# Patient Record
Sex: FEMALE | Race: WHITE
[De-identification: names, ages, dates, MRNs, and addresses within clinical notes are randomized per-mention and may not be internally consistent; named-entity substitution may affect disease eponyms.]

---

## 2021-03-27 ENCOUNTER — HOSPITAL ENCOUNTER (OUTPATIENT)
Dept: HOSPITAL 63 - DXRAD | Age: 81
End: 2021-03-27
Attending: NURSE PRACTITIONER
Payer: MEDICARE

## 2021-03-27 DIAGNOSIS — M47.814: ICD-10-CM

## 2021-03-27 DIAGNOSIS — R05: Primary | ICD-10-CM

## 2021-03-27 PROCEDURE — 71046 X-RAY EXAM CHEST 2 VIEWS: CPT

## 2021-03-27 NOTE — RAD
Exam Date:  3/27/2021 1:07 PM



XR CHEST 2V



Indication: Reason: COUGH. WEAKNESS. / Spl. Instructions:  / History:  







FINDINGS/

IMPRESSION:



There are patchy opacities in the lung bases bilaterally which may represent atelectasis and/or infil
trates.  

The cardiac silhouette is not enlarged with mild prominence of the pulmonary vasculature.  

There is no appreciable pleural effusion or pneumothorax.  

Degenerative changes are seen in the spine. 





Electronically signed by: Fady Dillon MD (3/27/2021 1:13 PM) [facility]

## 2021-08-05 ENCOUNTER — HOSPITAL ENCOUNTER (OUTPATIENT)
Dept: HOSPITAL 63 - 1 SOUTH | Age: 81
Setting detail: OBSERVATION
LOS: 1 days | Discharge: HOME | End: 2021-08-06
Attending: FAMILY MEDICINE | Admitting: FAMILY MEDICINE
Payer: MEDICARE

## 2021-08-05 VITALS — DIASTOLIC BLOOD PRESSURE: 81 MMHG | SYSTOLIC BLOOD PRESSURE: 135 MMHG

## 2021-08-05 VITALS — HEIGHT: 68 IN | WEIGHT: 235.23 LBS | BODY MASS INDEX: 35.65 KG/M2

## 2021-08-05 VITALS — SYSTOLIC BLOOD PRESSURE: 135 MMHG | DIASTOLIC BLOOD PRESSURE: 65 MMHG

## 2021-08-05 VITALS — DIASTOLIC BLOOD PRESSURE: 77 MMHG | SYSTOLIC BLOOD PRESSURE: 122 MMHG

## 2021-08-05 DIAGNOSIS — E43: ICD-10-CM

## 2021-08-05 DIAGNOSIS — Z79.899: ICD-10-CM

## 2021-08-05 DIAGNOSIS — E87.6: ICD-10-CM

## 2021-08-05 DIAGNOSIS — N18.31: ICD-10-CM

## 2021-08-05 DIAGNOSIS — N12: Primary | ICD-10-CM

## 2021-08-05 DIAGNOSIS — N39.0: ICD-10-CM

## 2021-08-05 LAB
% BANDS: 5 % (ref 0–9)
% LYMPHS: 38 % (ref 24–48)
% MONOS: 10 % (ref 0–10)
% MYELOS: 1 % (ref 0–0)
% SEGS: 46 % (ref 35–66)
ALBUMIN SERPL-MCNC: 2.6 G/DL (ref 3.4–5)
ALBUMIN/GLOB SERPL: 0.7 {RATIO} (ref 1–1.7)
ALP SERPL-CCNC: 121 U/L (ref 46–116)
ALT SERPL-CCNC: 43 U/L (ref 14–59)
ANION GAP SERPL CALC-SCNC: 9 MMOL/L (ref 6–14)
AST SERPL-CCNC: 38 U/L (ref 15–37)
BASOPHILS # BLD AUTO: 0 X10^3/UL (ref 0–0.2)
BASOPHILS NFR BLD: 0 % (ref 0–3)
BILIRUB SERPL-MCNC: 0.5 MG/DL (ref 0.2–1)
BUN/CREAT SERPL: 22 (ref 6–20)
CA-I SERPL ISE-MCNC: 26 MG/DL (ref 7–20)
CALCIUM SERPL-MCNC: 8.9 MG/DL (ref 8.5–10.1)
CHLORIDE SERPL-SCNC: 99 MMOL/L (ref 98–107)
CO2 SERPL-SCNC: 31 MMOL/L (ref 21–32)
CREAT SERPL-MCNC: 1.2 MG/DL (ref 0.6–1)
EOSINOPHIL NFR BLD: 0 % (ref 0–3)
EOSINOPHIL NFR BLD: 0 X10^3/UL (ref 0–0.7)
ERYTHROCYTE [DISTWIDTH] IN BLOOD BY AUTOMATED COUNT: 14.2 % (ref 11.5–14.5)
GFR SERPLBLD BASED ON 1.73 SQ M-ARVRAT: 43.1 ML/MIN
GLOBULIN SER-MCNC: 3.6 G/DL (ref 2.2–3.8)
GLUCOSE SERPL-MCNC: 135 MG/DL (ref 70–99)
HCT VFR BLD CALC: 34 % (ref 36–47)
HGB BLD-MCNC: 11.2 G/DL (ref 12–15.5)
LYMPHOCYTES # BLD: 2 X10^3/UL (ref 1–4.8)
LYMPHOCYTES NFR BLD AUTO: 30 % (ref 24–48)
MCH RBC QN AUTO: 30 PG (ref 25–35)
MCHC RBC AUTO-ENTMCNC: 33 G/DL (ref 31–37)
MCV RBC AUTO: 91 FL (ref 79–100)
MONO #: 1 X10^3/UL (ref 0–1.1)
MONOCYTES NFR BLD: 15 % (ref 0–9)
NEUT #: 3.5 X10^3UL (ref 1.8–7.7)
NEUTROPHILS NFR BLD AUTO: 54 % (ref 31–73)
PLATELET # BLD AUTO: 144 X10^3/UL (ref 140–400)
PLATELET # BLD EST: ADEQUATE 10*3/UL
POTASSIUM SERPL-SCNC: 3.1 MMOL/L (ref 3.5–5.1)
PROT SERPL-MCNC: 6.2 G/DL (ref 6.4–8.2)
RBC # BLD AUTO: 3.74 X10^6/UL (ref 3.5–5.4)
SODIUM SERPL-SCNC: 139 MMOL/L (ref 136–145)
WBC # BLD AUTO: 6.6 X10^3/UL (ref 4–11)

## 2021-08-05 PROCEDURE — 36415 COLL VENOUS BLD VENIPUNCTURE: CPT

## 2021-08-05 PROCEDURE — 83605 ASSAY OF LACTIC ACID: CPT

## 2021-08-05 PROCEDURE — 87040 BLOOD CULTURE FOR BACTERIA: CPT

## 2021-08-05 PROCEDURE — G0379 DIRECT REFER HOSPITAL OBSERV: HCPCS

## 2021-08-05 PROCEDURE — 71046 X-RAY EXAM CHEST 2 VIEWS: CPT

## 2021-08-05 PROCEDURE — 85007 BL SMEAR W/DIFF WBC COUNT: CPT

## 2021-08-05 PROCEDURE — 96372 THER/PROPH/DIAG INJ SC/IM: CPT

## 2021-08-05 PROCEDURE — 85025 COMPLETE CBC W/AUTO DIFF WBC: CPT

## 2021-08-05 PROCEDURE — 93005 ELECTROCARDIOGRAM TRACING: CPT

## 2021-08-05 PROCEDURE — 81001 URINALYSIS AUTO W/SCOPE: CPT

## 2021-08-05 PROCEDURE — 85379 FIBRIN DEGRADATION QUANT: CPT

## 2021-08-05 PROCEDURE — 96365 THER/PROPH/DIAG IV INF INIT: CPT

## 2021-08-05 PROCEDURE — 80048 BASIC METABOLIC PNL TOTAL CA: CPT

## 2021-08-05 PROCEDURE — 96366 THER/PROPH/DIAG IV INF ADDON: CPT

## 2021-08-05 PROCEDURE — G0378 HOSPITAL OBSERVATION PER HR: HCPCS

## 2021-08-05 PROCEDURE — 80053 COMPREHEN METABOLIC PANEL: CPT

## 2021-08-05 PROCEDURE — 96368 THER/DIAG CONCURRENT INF: CPT

## 2021-08-05 RX ADMIN — Medication SCH CAP: at 20:11

## 2021-08-05 NOTE — EKG
[facility] [address], [place] [address]

Test Date:    2021               Test Time:    18:09:13

Pat Name:     HIMANSHU RODRIGUEZ            Department:   

Patient ID:   [id number]           Room:         111 A

Gender:       F                        Technician:   

:          1940               Requested By: KARI FERNANDEZ

Order Number: [id number]            Reading MD:     

                                 Measurements

Intervals                              Axis          

Rate:         81                       P:            39

IN:           150                      QRS:          15

QRSD:         110                      T:            24

QT:           380                                    

QTc:          447                                    

                           Interpretive Statements

SINUS RHYTHM

QRS(T) CONTOUR ABNORMALITY

CONSIDER INFERIOR MYOCARDIAL DAMAGE

POSSIBLY ABNORMAL ECG

RI6.01

No previous ECG available for comparison

## 2021-08-06 VITALS — SYSTOLIC BLOOD PRESSURE: 116 MMHG | DIASTOLIC BLOOD PRESSURE: 82 MMHG

## 2021-08-06 VITALS — SYSTOLIC BLOOD PRESSURE: 122 MMHG | DIASTOLIC BLOOD PRESSURE: 73 MMHG

## 2021-08-06 LAB
ANION GAP SERPL CALC-SCNC: 9 MMOL/L (ref 6–14)
APTT PPP: YELLOW S
BACTERIA #/AREA URNS HPF: 0 /HPF
BILIRUB UR QL STRIP: (no result)
CA-I SERPL ISE-MCNC: 24 MG/DL (ref 7–20)
CALCIUM SERPL-MCNC: 9.5 MG/DL (ref 8.5–10.1)
CHLORIDE SERPL-SCNC: 101 MMOL/L (ref 98–107)
CO2 SERPL-SCNC: 31 MMOL/L (ref 21–32)
CREAT SERPL-MCNC: 1.1 MG/DL (ref 0.6–1)
FIBRINOGEN PPP-MCNC: CLEAR MG/DL
GFR SERPLBLD BASED ON 1.73 SQ M-ARVRAT: 47.7 ML/MIN
GLUCOSE SERPL-MCNC: 121 MG/DL (ref 70–99)
GLUCOSE UR STRIP-MCNC: (no result) MG/DL
NITRITE UR QL STRIP: (no result)
POTASSIUM SERPL-SCNC: 3.2 MMOL/L (ref 3.5–5.1)
RBC #/AREA URNS HPF: 0 /HPF (ref 0–2)
SODIUM SERPL-SCNC: 141 MMOL/L (ref 136–145)
SP GR UR STRIP: <=1.005
SQUAMOUS #/AREA URNS LPF: (no result) /LPF
UROBILINOGEN UR-MCNC: 0.2 MG/DL
WBC #/AREA URNS HPF: (no result) /HPF (ref 0–4)

## 2021-08-06 RX ADMIN — Medication SCH CAP: at 08:44

## 2021-08-06 NOTE — RAD
EXAM: Chest, 2 views.



HISTORY: Shortness of breath.



COMPARISON: 3/27/2021



FINDINGS: 2 views of the chest are obtained. There is stable diffuse increased interstitial opacity. 
There is no consolidation, pleural effusion or pneumothorax. There is stable enlargement of the left 
hilum. The heart is normal in size.



IMPRESSION: 

1. Stable diffuse increased interstitial opacity suggesting interstitial infiltrate or chronic inters
titial changes.

2. Stable left hilar prominence due to enlarged central pulmonary vessels or lymphadenopathy.



Electronically signed by: Fiorella Dumont MD (8/6/2021 8:30 AM) [id number]

## 2021-08-06 NOTE — DS
DATE OF DISCHARGE: 08/06/2021

HOSPITAL COURSE:  The patient is an 81-year-old admitted with possible 

pyelonephritis, been having nausea, vomiting, been following with increased 

weakness despite being treated as an outpatient for her what appeared to be a 

urinary tract infection and got up into her kidneys.  The patient was running a 

temperature of 103 the night before being admitted to the hospital. The patient 

was placed on IV antibiotic therapy.  X-rays did demonstrate the possibility of 

an interstitial infiltrate, possibility along with a course of urinary tract 

infection.  The patient otherwise has made excellent progress during the rest of

her hospitalization.  The patient had no trouble breathing, no chest pain, 

shortness of breath per se.  The patient did have slight elevation of her AST of

38, alkaline phosphatase 121.  The patient's renal function decreased slightly 

to 47 on GFR.  The patient's hemoglobin 11.2 and 34.  In any case, the patient 

was admitted, placed on IV antibiotic therapy of Levaquin and Rocephin, made 

excellent progress.  Final culture reports came back on her urine and showed E. 

coli.  She was placed on Levaquin.  I will make further evaluation on her as an 

outpatient where she was placed on oral antibiotics and she was able to keep 

things down and was discharged home.



IMPRESSION:  Therefore, pyelonephritis, possible interstitial pneumonia, 

hypokalemia, chronic kidney disease stage IIIA, severe protein malnutrition.



The patient will be discharged home.  Regular diet, decreased activity.  

Continue on the Levaquin.  Follow up accordingly in 7 to 10 days or sooner as 

needed.  Phone numbers were presented to the patient.







JB

DR: Carl   DD: 08/06/2021 14:11

DT: 08/06/2021 20:55   TID: [id number]

## 2021-08-17 ENCOUNTER — HOSPITAL ENCOUNTER (EMERGENCY)
Dept: HOSPITAL 63 - ER | Age: 81
Discharge: HOME | End: 2021-08-17
Payer: MEDICARE

## 2021-08-17 VITALS — DIASTOLIC BLOOD PRESSURE: 78 MMHG | SYSTOLIC BLOOD PRESSURE: 136 MMHG

## 2021-08-17 VITALS — WEIGHT: 235.23 LBS | BODY MASS INDEX: 35.65 KG/M2 | HEIGHT: 68 IN

## 2021-08-17 DIAGNOSIS — R42: Primary | ICD-10-CM

## 2021-08-17 LAB
ANION GAP SERPL CALC-SCNC: 10 MMOL/L (ref 6–14)
APTT PPP: YELLOW S
BACTERIA #/AREA URNS HPF: 0 /HPF
BASOPHILS # BLD AUTO: 0 X10^3/UL (ref 0–0.2)
BASOPHILS NFR BLD: 0 % (ref 0–3)
BILIRUB UR QL STRIP: (no result)
CA-I SERPL ISE-MCNC: 30 MG/DL (ref 7–20)
CALCIUM SERPL-MCNC: 8.7 MG/DL (ref 8.5–10.1)
CHLORIDE SERPL-SCNC: 101 MMOL/L (ref 98–107)
CO2 SERPL-SCNC: 29 MMOL/L (ref 21–32)
CREAT SERPL-MCNC: 1.1 MG/DL (ref 0.6–1)
EOSINOPHIL NFR BLD: 0.1 X10^3/UL (ref 0–0.7)
EOSINOPHIL NFR BLD: 1 % (ref 0–3)
ERYTHROCYTE [DISTWIDTH] IN BLOOD BY AUTOMATED COUNT: 15.3 % (ref 11.5–14.5)
FIBRINOGEN PPP-MCNC: CLEAR MG/DL
GFR SERPLBLD BASED ON 1.73 SQ M-ARVRAT: 47.7 ML/MIN
GLUCOSE SERPL-MCNC: 100 MG/DL (ref 70–99)
GLUCOSE UR STRIP-MCNC: (no result) MG/DL
HCT VFR BLD CALC: 38 % (ref 36–47)
HGB BLD-MCNC: 12.5 G/DL (ref 12–15.5)
LYMPHOCYTES # BLD: 4.4 X10^3/UL (ref 1–4.8)
LYMPHOCYTES NFR BLD AUTO: 45 % (ref 24–48)
MCH RBC QN AUTO: 30 PG (ref 25–35)
MCHC RBC AUTO-ENTMCNC: 33 G/DL (ref 31–37)
MCV RBC AUTO: 91 FL (ref 79–100)
MONO #: 0.8 X10^3/UL (ref 0–1.1)
MONOCYTES NFR BLD: 8 % (ref 0–9)
NEUT #: 4.6 X10^3UL (ref 1.8–7.7)
NEUTROPHILS NFR BLD AUTO: 47 % (ref 31–73)
NITRITE UR QL STRIP: (no result)
PLATELET # BLD AUTO: 178 X10^3/UL (ref 140–400)
POTASSIUM SERPL-SCNC: 4.2 MMOL/L (ref 3.5–5.1)
RBC # BLD AUTO: 4.17 X10^6/UL (ref 3.5–5.4)
RBC #/AREA URNS HPF: 0 /HPF (ref 0–2)
SODIUM SERPL-SCNC: 140 MMOL/L (ref 136–145)
SP GR UR STRIP: 1.01
SQUAMOUS #/AREA URNS LPF: (no result) /LPF
UROBILINOGEN UR-MCNC: 0.2 MG/DL
WBC # BLD AUTO: 9.9 X10^3/UL (ref 4–11)
WBC #/AREA URNS HPF: (no result) /HPF (ref 0–4)

## 2021-08-17 PROCEDURE — 99285 EMERGENCY DEPT VISIT HI MDM: CPT

## 2021-08-17 PROCEDURE — 93005 ELECTROCARDIOGRAM TRACING: CPT

## 2021-08-17 PROCEDURE — 85025 COMPLETE CBC W/AUTO DIFF WBC: CPT

## 2021-08-17 PROCEDURE — 96360 HYDRATION IV INFUSION INIT: CPT

## 2021-08-17 PROCEDURE — 71045 X-RAY EXAM CHEST 1 VIEW: CPT

## 2021-08-17 PROCEDURE — 84484 ASSAY OF TROPONIN QUANT: CPT

## 2021-08-17 PROCEDURE — 36415 COLL VENOUS BLD VENIPUNCTURE: CPT

## 2021-08-17 PROCEDURE — 80048 BASIC METABOLIC PNL TOTAL CA: CPT

## 2021-08-17 PROCEDURE — 81001 URINALYSIS AUTO W/SCOPE: CPT

## 2021-08-17 NOTE — RAD
Exam: Chest one view



INDICATION: Chest pain, cardiac workup



TECHNIQUE: Frontal view of the chest



Comparisons: 8/6/2021



FINDINGS:

Cardiomediastinal silhouette and pulmonary vessels are within normal limits.



Lung and pleural spaces are clear.



IMPRESSION:

No acute cardiopulmonary process



Electronically signed by: Jennifer Lawrence MD (8/17/2021 6:54 PM) [facility]

## 2021-08-17 NOTE — EKG
[facility] [address], [place] [address]

Test Date:    2021               Test Time:    19:22:42

Pat Name:     HIMANSHU RODRIGUEZ            Department:   

Patient ID:   [id number]           Room:          

Gender:       F                        Technician:   

:          1940               Requested By: SANTA RING

Order Number: [id number]            Reading MD:     

                                 Measurements

Intervals                              Axis          

Rate:         73                       P:            37

CT:           172                      QRS:          37

QRSD:         98                       T:            29

QT:           394                                    

QTc:          438                                    

                           Interpretive Statements

SINUS RHYTHM

OTHERWISE NORMAL ECG

RI6.02

No previous ECG available for comparison

## 2021-08-17 NOTE — PHYS DOC
Past History


Additional Past Medical Histor:  GOUT


Past Surgical History:  , Hysterectomy, Knee Replacement, Other


Additional Past Surgical Histo:  L RETINAL DETACHMENT, BLADDER LIFT





Adult General


Chief Complaint


Chief Complaint:  HYPOTENSION





HPI


HPI


Patient is an 81-year-old female with a past medical history significant for 

recent hospitalization for pyelonephritis, discharge 6 days ago on Levaquin and 

finished her course who presents with a chief complaint of intermittent 

lightheadedness since then.  States that since she is got home from the hospital

she has times during the day where she stands up fast and feels lightheaded.  

Denies any headache, actual feelings of faintness or like she is going to pass 

out, headache, fevers, chest pain, shortness of breath, abdominal pain, nausea, 

vomiting, dysuria, hematuria, diarrhea or blood in the stool.  Denies any 

confusion, slurred speech, numbness/weakness/tingling, trouble sitting, standing

or walking.





Review of Systems


Review of Systems


Review of systems otherwise unremarkable except noted in HPI





Allergies


Allergies





Allergies








Coded Allergies Type Severity Reaction Last Updated Verified


 


  No Known Drug Allergies    21 No











Physical Exam


Physical Exam





Constitutional: Well developed, well nourished, no acute distress, non-toxic 

appearance. []


HENT: Normocephalic, atraumatic, 


Eyes: PERRLA, EOMI, conjunctiva normal, no discharge. [] 


Neck: Normal range of motion, no tenderness, supple, no stridor. [] 


Cardiovascular:Heart rate regular rhythm, no murmur []


Lungs & Thorax:  Bilateral breath sounds clear to auscultation []


Abdomen:  soft, no tenderness, no masses, no pulsatile masses. [] 


Skin: Warm, dry, no erythema, no rash. [] 


Back:  no CVA tenderness. [] 


Extremities: No tenderness, ROM intact, no edema. [] 


Neurologic: Alert and oriented X 3, normal motor function, normal sensory 

function, able to sit, stand and walk without issue, cranial nerves intact, no 

focal deficits noted. []


Psychologic: Affect normal, judgement normal, mood normal. []





Current Patient Data


Vital Signs





                                   Vital Signs








  Date Time  Temp Pulse Resp B/P (MAP) Pulse Ox O2 Delivery O2 Flow Rate FiO2


 


21 18:37 97.8 87 18 152/50  Room Air  











EKG


EKG


Rate of 73, QRS of 98, QTc of 438, no STEMI []





Radiology/Procedures


Radiology/Procedures


[]Exam: Chest one view





INDICATION: Chest pain, cardiac workup





TECHNIQUE: Frontal view of the chest





Comparisons: 2021





FINDINGS:


Cardiomediastinal silhouette and pulmonary vessels are within normal limits.





Lung and pleural spaces are clear.





IMPRESSION:


No acute cardiopulmonary process





Electronically signed by: Jennifer Lawrence MD (2021 6:54 PM) [facility]





Heart Score


C/O Chest Pain:  No


Risk Factors:


Risk Factors:  DM, Current or recent (<one month) smoker, HTN, HLP, family 

history of CAD, obesity.


Risk Scores:


Risk Factors:  DM, Current or recent (<one month) smoker, HTN, HLP, family 

history of CAD, obesity.





Course & Med Decision Making


Course & Med Decision Making


Patient is an 81-year-old female who presents with episodes of lightheadedness 

over the last few days after getting out of the hospital


Vital signs not concerning.  Physical exam noted above.  Placed on the monitor 

with IV access established.  Given IV fluid resuscitation.


EKG noted above with no STEMI.  Troponin not concerning.  Laboratory analysis 

not concerning.  Urinalysis not concerning.  Patient asymptomatic and ready to 

be discharged home.


Discussed all findings with patient and family.  Advised to follow-up in the 

morning with primary care physician to set up a follow-up visit.  Gave return 

precautions to the ED.


Family grateful, verbalized understanding and agreed with plan of discharge.


[]





Dragon Disclaimer


Dragon Disclaimer


This electronic medical record was generated, in whole or in part, using a voice

 recognition dictation system.





Departure


Departure:


Impression:  


   Primary Impression:  


   Lightheadedness


Disposition:  01 HOME / SELF CARE / HOMELESS


Condition:  GOOD


Referrals:  


KARI FERNANDEZ MD (PCP)


Patient Instructions:  Dizziness





Additional Instructions:  


Thank you for coming into the emergency department tonight and allowing us to 

take care of you.  Please read all the attached information very carefully go 

back over the things we discussed.  As discussed, the things we looked at today 

were reassuring for any emergencies but that does not mean you do not have 

something going on as we discussed that needs further evaluation and treatment. 

 Please call your primary care physician first thing in the morning to update on

 ED visit and set up a follow-up as soon as possible to discuss need for further

 evaluation and treatment especially cardiac monitor as we discussed.  Please 

come back to the emergency department immediately with new or concerning 

symptoms as we discussed.











SANTA RING MD               Aug 17, 2021 19:17

## 2021-11-20 ENCOUNTER — HOSPITAL ENCOUNTER (INPATIENT)
Dept: HOSPITAL 63 - ER | Age: 81
LOS: 3 days | Discharge: HOME HEALTH | DRG: 602 | End: 2021-11-23
Attending: FAMILY MEDICINE | Admitting: FAMILY MEDICINE
Payer: MEDICARE

## 2021-11-20 VITALS — BODY MASS INDEX: 37.16 KG/M2 | WEIGHT: 250.89 LBS | HEIGHT: 69 IN

## 2021-11-20 VITALS — DIASTOLIC BLOOD PRESSURE: 78 MMHG | SYSTOLIC BLOOD PRESSURE: 177 MMHG

## 2021-11-20 DIAGNOSIS — Z87.440: ICD-10-CM

## 2021-11-20 DIAGNOSIS — I10: ICD-10-CM

## 2021-11-20 DIAGNOSIS — F32.A: ICD-10-CM

## 2021-11-20 DIAGNOSIS — Z82.5: ICD-10-CM

## 2021-11-20 DIAGNOSIS — B96.20: ICD-10-CM

## 2021-11-20 DIAGNOSIS — I48.91: ICD-10-CM

## 2021-11-20 DIAGNOSIS — I16.0: ICD-10-CM

## 2021-11-20 DIAGNOSIS — Z82.0: ICD-10-CM

## 2021-11-20 DIAGNOSIS — J15.6: ICD-10-CM

## 2021-11-20 DIAGNOSIS — D64.9: ICD-10-CM

## 2021-11-20 DIAGNOSIS — Z20.822: ICD-10-CM

## 2021-11-20 DIAGNOSIS — M10.9: ICD-10-CM

## 2021-11-20 DIAGNOSIS — Z96.653: ICD-10-CM

## 2021-11-20 DIAGNOSIS — N12: ICD-10-CM

## 2021-11-20 DIAGNOSIS — E44.0: ICD-10-CM

## 2021-11-20 DIAGNOSIS — M19.90: ICD-10-CM

## 2021-11-20 DIAGNOSIS — Z80.3: ICD-10-CM

## 2021-11-20 DIAGNOSIS — L03.116: Primary | ICD-10-CM

## 2021-11-20 DIAGNOSIS — Z90.710: ICD-10-CM

## 2021-11-20 LAB
ALBUMIN SERPL-MCNC: 2.9 G/DL (ref 3.4–5)
ALBUMIN/GLOB SERPL: 0.8 {RATIO} (ref 1–1.7)
ALP SERPL-CCNC: 81 U/L (ref 46–116)
ALT SERPL-CCNC: 16 U/L (ref 14–59)
ANION GAP SERPL CALC-SCNC: 10 MMOL/L (ref 6–14)
APTT PPP: YELLOW S
AST SERPL-CCNC: 17 U/L (ref 15–37)
BACTERIA #/AREA URNS HPF: (no result) /HPF
BASOPHILS # BLD AUTO: 0 X10^3/UL (ref 0–0.2)
BASOPHILS NFR BLD: 1 % (ref 0–3)
BILIRUB SERPL-MCNC: 0.4 MG/DL (ref 0.2–1)
BILIRUB UR QL STRIP: (no result)
BUN/CREAT SERPL: 16 (ref 6–20)
CA-I SERPL ISE-MCNC: 14 MG/DL (ref 7–20)
CALCIUM SERPL-MCNC: 9 MG/DL (ref 8.5–10.1)
CHLORIDE SERPL-SCNC: 102 MMOL/L (ref 98–107)
CO2 SERPL-SCNC: 28 MMOL/L (ref 21–32)
CREAT SERPL-MCNC: 0.9 MG/DL (ref 0.6–1)
EOSINOPHIL NFR BLD: 0.4 X10^3/UL (ref 0–0.7)
EOSINOPHIL NFR BLD: 5 % (ref 0–3)
ERYTHROCYTE [DISTWIDTH] IN BLOOD BY AUTOMATED COUNT: 15.5 % (ref 11.5–14.5)
FIBRINOGEN PPP-MCNC: (no result) MG/DL
GFR SERPLBLD BASED ON 1.73 SQ M-ARVRAT: 60.1 ML/MIN
GLOBULIN SER-MCNC: 3.7 G/DL (ref 2.2–3.8)
GLUCOSE SERPL-MCNC: 88 MG/DL (ref 70–99)
GLUCOSE UR STRIP-MCNC: (no result) MG/DL
HCT VFR BLD CALC: 31.8 % (ref 36–47)
HGB BLD-MCNC: 10.3 G/DL (ref 12–15.5)
LYMPHOCYTES # BLD: 3.2 X10^3/UL (ref 1–4.8)
LYMPHOCYTES NFR BLD AUTO: 45 % (ref 24–48)
MCH RBC QN AUTO: 30 PG (ref 25–35)
MCHC RBC AUTO-ENTMCNC: 33 G/DL (ref 31–37)
MCV RBC AUTO: 93 FL (ref 79–100)
MONO #: 0.9 X10^3/UL (ref 0–1.1)
MONOCYTES NFR BLD: 12 % (ref 0–9)
NEUT #: 2.6 X10^3UL (ref 1.8–7.7)
NEUTROPHILS NFR BLD AUTO: 37 % (ref 31–73)
NITRITE UR QL STRIP: (no result)
PLATELET # BLD AUTO: 256 X10^3/UL (ref 140–400)
POTASSIUM SERPL-SCNC: 3.7 MMOL/L (ref 3.5–5.1)
PROT SERPL-MCNC: 6.6 G/DL (ref 6.4–8.2)
RBC # BLD AUTO: 3.43 X10^6/UL (ref 3.5–5.4)
RBC #/AREA URNS HPF: (no result) /HPF (ref 0–2)
SODIUM SERPL-SCNC: 140 MMOL/L (ref 136–145)
SP GR UR STRIP: 1.01
SQUAMOUS #/AREA URNS LPF: (no result) /LPF
UROBILINOGEN UR-MCNC: 0.2 MG/DL
WBC # BLD AUTO: 7.1 X10^3/UL (ref 4–11)
WBC #/AREA URNS HPF: (no result) /HPF (ref 0–4)

## 2021-11-20 PROCEDURE — 71045 X-RAY EXAM CHEST 1 VIEW: CPT

## 2021-11-20 PROCEDURE — 84484 ASSAY OF TROPONIN QUANT: CPT

## 2021-11-20 PROCEDURE — 87186 SC STD MICRODIL/AGAR DIL: CPT

## 2021-11-20 PROCEDURE — 93971 EXTREMITY STUDY: CPT

## 2021-11-20 PROCEDURE — 83880 ASSAY OF NATRIURETIC PEPTIDE: CPT

## 2021-11-20 PROCEDURE — 36415 COLL VENOUS BLD VENIPUNCTURE: CPT

## 2021-11-20 PROCEDURE — 87086 URINE CULTURE/COLONY COUNT: CPT

## 2021-11-20 PROCEDURE — 87077 CULTURE AEROBIC IDENTIFY: CPT

## 2021-11-20 PROCEDURE — 96365 THER/PROPH/DIAG IV INF INIT: CPT

## 2021-11-20 PROCEDURE — 93005 ELECTROCARDIOGRAM TRACING: CPT

## 2021-11-20 PROCEDURE — 87205 SMEAR GRAM STAIN: CPT

## 2021-11-20 PROCEDURE — 85025 COMPLETE CBC W/AUTO DIFF WBC: CPT

## 2021-11-20 PROCEDURE — 81001 URINALYSIS AUTO W/SCOPE: CPT

## 2021-11-20 PROCEDURE — 80053 COMPREHEN METABOLIC PANEL: CPT

## 2021-11-20 PROCEDURE — 87426 SARSCOV CORONAVIRUS AG IA: CPT

## 2021-11-20 PROCEDURE — 87040 BLOOD CULTURE FOR BACTERIA: CPT

## 2021-11-20 PROCEDURE — U0003 INFECTIOUS AGENT DETECTION BY NUCLEIC ACID (DNA OR RNA); SEVERE ACUTE RESPIRATORY SYNDROME CORONAVIRUS 2 (SARS-COV-2) (CORONAVIRUS DISEASE [COVID-19]), AMPLIFIED PROBE TECHNIQUE, MAKING USE OF HIGH THROUGHPUT TECHNOLOGIES AS DESCRIBED BY CMS-2020-01-R: HCPCS

## 2021-11-20 RX ADMIN — OXYCODONE HYDROCHLORIDE AND ACETAMINOPHEN PRN TAB: 5; 325 TABLET ORAL at 23:49

## 2021-11-20 NOTE — EKG
[facility] [address], [place] [address]

Test Date:    2021               Test Time:    21:46:38

Pat Name:     HIMANSHU RODRIGUEZ            Department:   

Patient ID:   [id number]           Room:         111 A

Gender:       F                        Technician:   

:          1940               Requested By: JESSICA LOA

Order Number: [id number]            Reading MD:   Ambrose Bland

                                 Measurements

Intervals                              Axis          

Rate:         74                       P:            25

NM:           168                      QRS:          31

QRSD:         104                      T:            33

QT:           394                                    

QTc:          438                                    

                           Interpretive Statements

SINUS RHYTHM

NORMAL ECG

RI6.02

Compared to ECG 2021 19:22:42

No significant changes

Electronically Signed On 2021 7:12:18 CST by Ambrose Bland

## 2021-11-20 NOTE — RAD
Left lower extremity venous ultrasound, :



History: Unable to bear weight, knee replacement one week ago



Duplex evaluation including grayscale, color flow and spectral Doppler analysis was performed.  The  
femoral and popliteal veins show no filling defects to suggest DVT.   The visualized calf veins are u
nremarkable.



There is edema in the soft tissues a left leg.





IMPRESSION:

1. There is no sonographic evidence of deep vein thrombosis in the left lower extremity



Electronically signed by: Mina Blanco MD (11/20/2021 9:11 PM) [facility]

## 2021-11-20 NOTE — PHYS DOC
Past History


Additional Past Medical Histor:  GOUT


 (JESSICA LAO)


Past Surgical History:  , Hysterectomy, Knee Replacement, Other


Additional Past Surgical Histo:  L RETINAL DETACHMENT, BLADDER LIFT


 (JESSICA LAO)


Alcohol Use:  None


 (JESSICA LAO)





General Adult


EDM:


Chief Complaint:  HYPERTENSION





HPI:


HPI:





Patient is an 81-year-old female that presents today via [facility] 

with inability to walk on her own starting this morning.  Patient had left total

knee replacement on November 10, she has then since been living with her 

daughter doing rehab with physical therapist inside the home.  Report from 

daughter and patient is that yesterday she had a great physical therapy session 

walking on her own without any use of a walker.  And then starting this morning 

patient said she got up by herself today but did not feel the same as she did 

yesterday did have some difficulties getting around and by this afternoon 

patient was unable to bear weight or walk at all with her walker.  Patient 

denies chest pain, shortness of air, fever, chills, nausea vomiting, or 

diarrhea.  Patient does state her leg is swollen but she said it has been swo

llen since her surgery.


 (JESSICA LAO)





Review of Systems:


Review of Systems:


Constitutional:  Denies fever or chills 


Eyes:  Denies change in visual acuity 


HENT:  Denies nasal congestion or sore throat 


Respiratory:  Denies cough or shortness of breath 


Cardiovascular:  Denies chest pain or edema 


GI:  Denies abdominal pain, nausea, vomiting, bloody stools or diarrhea 


: Denies dysuria 


Musculoskeletal: Left leg pain and weakness status post total knee replacement


Integument:  Denies rash 


Neurologic:  Denies headache, focal weakness or sensory changes 


Endocrine:  Denies polyuria or polydipsia 


Lymphatic:  Denies swollen glands 


Psychiatric:  Denies depression or anxiety


 (JESSICA LAO)





Current Medications:


Current Meds:


Allopurinol 300 mg 


Macrobid 1 tablet twice daily


Omeprazole 20 mg daily


Vitamin D


Fish


Calcium


Baby aspirin


Eliquis 5 mg 3 times daily


Cardizem 20 mg daily


Tylenol PM 


Celebrex 200 mg


 (JESSICA LAO)





Allergies:


Allergies:





Allergies








Coded Allergies Type Severity Reaction Last Updated Verified


 


  No Known Drug Allergies    21 No








 (JESSICA LAO APRN)





Physical Exam:


PE:





Constitutional: Well developed, well nourished, no acute distress, non-toxic 

appearance. []


HENT: Normocephalic, atraumatic, bilateral external ears normal, oropharynx 

moist, no oral exudates, nose normal. []


Eyes: PERRLA, EOMI, conjunctiva normal, no discharge. [] 


Neck: Normal range of motion, no tenderness, supple, no stridor. [] 


Cardiovascular:Heart rate regular rhythm, no murmur []


Lungs & Thorax:  Bilateral breath sounds clear to auscultation []


Abdomen: Bowel sounds normal, soft, no tenderness, no masses, no pulsatile 

masses. [] 


Skin: Warm, dry, no erythema, no rash. [] 


Back: No tenderness, no CVA tenderness. [] 


Extremities: Left leg is swollen, surgical incision dressing is in place midline

 knee, knee is warm to touch, pedal pulses 2+, sensory intact distal to the 

incision, right leg no swelling noted midline knee incision noted pedal pulses 

2+


Neurologic: Alert and oriented X 3, normal motor function, normal sensory 

function, no focal deficits noted. []


Psychologic: Affect normal, judgement normal, mood normal. []


 (JESSICA LAO APRN)





Current Patient Data:


Labs:





Laboratory Tests








Test


 21


19:00 21


20:13


 


Urine Collection Type Clean catch  


 


Urine Color Yellow  


 


Urine Clarity Hazy  


 


Urine pH 6.0  


 


Urine Specific Gravity 1.015  


 


Urine Protein Neg  


 


Urine Glucose (UA) Neg mg/dL  


 


Urine Ketones (Stick) Neg mg/dL  


 


Urine Blood Small  


 


Urine Nitrite Neg  


 


Urine Bilirubin Neg  


 


Urine Urobilinogen Dipstick 0.2 mg/dL  


 


Urine Leukocyte Esterase Large  


 


Urine RBC 6-10 /HPF  


 


Urine WBC Tntc /HPF  


 


Urine Squamous Epithelial


Cells Mod /LPF 


 





 


Urine Bacteria Many /HPF  


 


White Blood Count  7.1 x10^3/uL 


 


Red Blood Count  3.43 x10^6/uL 


 


Hemoglobin  10.3 g/dL 


 


Hematocrit  31.8 % 


 


Mean Corpuscular Volume  93 fL 


 


Mean Corpuscular Hemoglobin  30 pg 


 


Mean Corpuscular Hemoglobin


Concent 


 33 g/dL 





 


Red Cell Distribution Width  15.5 % 


 


Platelet Count  256 x10^3/uL 


 


Neutrophils (%) (Auto)  37 % 


 


Lymphocytes (%) (Auto)  45 % 


 


Monocytes (%) (Auto)  12 % 


 


Eosinophils (%) (Auto)  5 % 


 


Basophils (%) (Auto)  1 % 


 


Neutrophils # (Auto)  2.6 x10^3uL 


 


Lymphocytes # (Auto)  3.2 x10^3/uL 


 


Monocytes # (Auto)  0.9 x10^3/uL 


 


Eosinophils # (Auto)  0.4 x10^3/uL 


 


Basophils # (Auto)  0.0 x10^3/uL 


 


Sodium Level  140 mmol/L 


 


Potassium Level  3.7 mmol/L 


 


Chloride Level  102 mmol/L 


 


Carbon Dioxide Level  28 mmol/L 


 


Anion Gap  10 


 


Blood Urea Nitrogen  14 mg/dL 


 


Creatinine  0.9 mg/dL 


 


Estimated GFR


(Cockcroft-Gault) 


 60.1 





 


BUN/Creatinine Ratio  16 


 


Glucose Level  88 mg/dL 


 


Calcium Level  9.0 mg/dL 


 


Total Bilirubin  0.4 mg/dL 


 


Aspartate Amino Transf


(AST/SGOT) 


 17 U/L 





 


Alanine Aminotransferase


(ALT/SGPT) 


 16 U/L 





 


Alkaline Phosphatase  81 U/L 


 


Total Protein  6.6 g/dL 


 


Albumin  2.9 g/dL 


 


Albumin/Globulin Ratio  0.8 








Current Medications








 Medications


  (Trade)  Dose


 Ordered  Sig/Fe


 Route


 PRN Reason  Start Time


 Stop Time Status Last Admin


Dose Admin


 


 Acetaminophen


  (Tylenol)  1,000 mg  1X  ONCE


 PO


   21 20:45


 21 20:46 DC  





 


 Ceftriaxone


 Sodium 1 gm/


 Sodium Chloride  50 ml @ 


 100 mls/hr  1X  ONCE


 IV


   21 22:00


 21 22:29   





 


 Sodium Chloride  50 ml @ As


 Directed  STK-MED ONCE


 .ROUTE


   21 21:33


 21 21:33 DC  





 


 Ceftriaxone Sodium


  (Rocephin)  1 gm  STK-MED ONCE


 .ROUTE


   21 21:33


 21 21:33 DC  





 


 Oxycodone/


 Acetaminophen


  (Percocet 5/325)  1 tab  PRN Q4HRS  PRN


 PO


 MODERATE PAIN 4-6  21 21:45


     











Vital Signs:





Vital Signs








  Date Time  Temp Pulse Resp B/P (MAP) Pulse Ox O2 Delivery O2 Flow Rate FiO2


 


21 21:09  66 17 155/67 (96) 95 Room Air  


 


21 20:03  79  157/76 (103) 92 Room Air  


 


21 18:59  75 18 182/75 (110) 95 Room Air  








 (JESSICA LAO)





EKG:


EKG:


[]


 (JESSICA LAO)


EKG:


My interpretation EKG shows a sinus rhythm at 74 bpm.  No acute morphology.  

Time of EKG is 2148 hrs.


 (TARA BALLESTEROS MD)


Radiology/Procedures:


Radiology/Procedures:


REASON: weakness


PROCEDURE: CHEST AP ONLY





AP chest.





HISTORY: Weakness





AP view was taken of the chest. Heart is within normal limits in size. There is 

no effusion. There are interstitial changes in the lungs which are new compared 

to the study from August from interstitial infiltrates or edema.





IMPRESSION:


1. Development of interstitial infiltrates or edema.





Electronically signed by: Mina Blanco MD (2021 8:05 PM) [facility][]


 (JESSICA LAO)





Heart Score:


C/O Chest Pain:  N/A


Risk Factors:


Risk Factors:  DM, Current or recent (<one month) smoker, HTN, HLP, family 

history of CAD, obesity.


Risk Scores:


Score 0 - 3:  2.5% MACE over next 6 weeks - Discharge Home


Score 4 - 6:  20.3% MACE over next 6 weeks - Admit for Clinical Observation


Score 7 - 10:  72.7% MACE over next 6 weeks - Early Invasive Strategies


 (JESSICA LAO)





Course & Med Decision Making:


Course & Med Decision Making


Pertinent Labs and Imaging studies reviewed. (See chart for details)





 at bedside talking with patient and daughter informed her of the 

radiological results and lab results, will admit patient for urinary tract 

infection, pneumonia, and weakness.  Patient and daughter agreeable with 

admission. 


 (JESSICA LAO APRSENA)





Dragon Disclaimer:


Dragon Disclaimer:


This electronic medical record was generated, in whole or in part, using a voice

 recognition dictation system.


 (JESSICA LAO APRSENA)





Departure


Departure:


Impression:  


   Primary Impression:  


   UTI (urinary tract infection)


   Qualified Codes:  N39.0 - Urinary tract infection, site not specified


   Additional Impressions:  


   Pneumonia


   Qualified Codes:  J18.9 - Pneumonia, unspecified organism


   Weakness


Disposition:   ADMITTED AS INPATIENT


Admitting Physician:  Kari Fernandez


 (JESSICA LAO)


Condition:  GUARDED


Referrals:  


KARI FERNANDEZ MD (PCP)





Attending Signature


Attending Signature


I have participated in the care of this patient and I have reviewed and agree 

with all pertinent clinical information above including history, exam, and 

recommendations.





 (TARA BALLESTEROS MD)











JESSICA LAO       2021 19:45


TARA BALLESTEROS MD           2021 21:52

## 2021-11-20 NOTE — RAD
AP chest.



HISTORY: Weakness



AP view was taken of the chest. Heart is within normal limits in size. There is no effusion. There ar
e interstitial changes in the lungs which are new compared to the study from August from interstitial
 infiltrates or edema.



IMPRESSION:

1. Development of interstitial infiltrates or edema.



Electronically signed by: Mina Blanco MD (11/20/2021 8:05 PM) [facility]

## 2021-11-21 VITALS — SYSTOLIC BLOOD PRESSURE: 169 MMHG | DIASTOLIC BLOOD PRESSURE: 78 MMHG

## 2021-11-21 VITALS — DIASTOLIC BLOOD PRESSURE: 75 MMHG | SYSTOLIC BLOOD PRESSURE: 145 MMHG

## 2021-11-21 VITALS — SYSTOLIC BLOOD PRESSURE: 159 MMHG | DIASTOLIC BLOOD PRESSURE: 74 MMHG

## 2021-11-21 VITALS — DIASTOLIC BLOOD PRESSURE: 76 MMHG | SYSTOLIC BLOOD PRESSURE: 166 MMHG

## 2021-11-21 VITALS — SYSTOLIC BLOOD PRESSURE: 164 MMHG | DIASTOLIC BLOOD PRESSURE: 68 MMHG

## 2021-11-21 RX ADMIN — OXYCODONE HYDROCHLORIDE AND ACETAMINOPHEN PRN TAB: 5; 325 TABLET ORAL at 10:32

## 2021-11-21 RX ADMIN — APIXABAN SCH MG: 5 TABLET, FILM COATED ORAL at 14:40

## 2021-11-21 RX ADMIN — APIXABAN SCH MG: 5 TABLET, FILM COATED ORAL at 20:03

## 2021-11-22 VITALS — SYSTOLIC BLOOD PRESSURE: 184 MMHG | DIASTOLIC BLOOD PRESSURE: 80 MMHG

## 2021-11-22 VITALS — DIASTOLIC BLOOD PRESSURE: 79 MMHG | SYSTOLIC BLOOD PRESSURE: 175 MMHG

## 2021-11-22 VITALS — SYSTOLIC BLOOD PRESSURE: 175 MMHG | DIASTOLIC BLOOD PRESSURE: 81 MMHG

## 2021-11-22 VITALS — DIASTOLIC BLOOD PRESSURE: 79 MMHG | SYSTOLIC BLOOD PRESSURE: 150 MMHG

## 2021-11-22 VITALS — DIASTOLIC BLOOD PRESSURE: 76 MMHG | SYSTOLIC BLOOD PRESSURE: 161 MMHG

## 2021-11-22 RX ADMIN — APIXABAN SCH MG: 5 TABLET, FILM COATED ORAL at 20:19

## 2021-11-22 RX ADMIN — VITAMIN D, TAB 1000IU (100/BT) SCH UNIT: 25 TAB at 07:46

## 2021-11-22 RX ADMIN — Medication SCH UNIT: at 07:46

## 2021-11-22 RX ADMIN — ALLOPURINOL SCH MG: 300 TABLET ORAL at 07:46

## 2021-11-22 RX ADMIN — Medication SCH CAP: at 20:19

## 2021-11-22 RX ADMIN — Medication SCH MG: at 07:45

## 2021-11-22 RX ADMIN — APIXABAN SCH MG: 5 TABLET, FILM COATED ORAL at 07:46

## 2021-11-22 RX ADMIN — CYANOCOBALAMIN TAB 250 MCG SCH MCG: 250 TAB at 07:46

## 2021-11-22 RX ADMIN — ACETAMINOPHEN PRN MG: 325 TABLET, FILM COATED ORAL at 12:36

## 2021-11-22 NOTE — HP
DATE OF SERVICE: 11/21/2021

ADMIT DATE: 11/20/2021

HISTORY OF PRESENT ILLNESS:  She is an 81-year-old female came in through the 

Emergency Room, recently had a total left knee replacement 11/10/2021 down at 

[place], apparently was unable to walk because of severe pain in that left 

knee.  The patient noted that she was having problems getting around and when 

she was noted to have problems with this, she was brought in through the 

Emergency Room and was found to have a significant urinary tract infection that 

was unresponsive to oral medications.  She had too numerous to count white blood

cells and patient was having elevated blood pressures greater than 180.  As a 

result of this, the patient was admitted for IV antibiotic therapy since she had

failed oral antibiotics and the possibility of an infection in that joint was of

high consideration.  The patient was COVID negative.



PAST MEDICAL HISTORY:  Includes that of recent history of atrial fibrillation 

with rapid ventricular response, heart murmur, anticoagulant therapy with 

Eliquis, hypertension, abdominal surgery, hysterectomy, arthritis, 

osteoarthritis, gout, joint replacement right knee, left total knee replacement 

just here in the last 11 days on 11/10/2021, depression, recurrent infections, 

frequent UTIs.



FAMILY HISTORY:  Positive for heart valve problems, prostate cancer, breast 

cancer in first-degree relative mother, Parkinson's disease, COPD, Alzheimer's 

disease.



ALLERGIES:  No known drug allergies.



SOCIAL HISTORY:  The patient denies smoking, alcohol or drug use.  FULL CODE.



REVIEW OF SYSTEMS:  The patient has fever and chills.  Denies headaches, visual 

change, blurred vision, double vision.  Denies any melena, hematochezia or 

hematemesis and neurologically stable there.



PHYSICAL EXAMINATION:

GENERAL:  This is a very pleasant white female.

VITAL SIGNS:  Blood pressure 182/75, respiratory rate 20, pulse 73, temperature 

99.1, room air 92.

HEENT:  The patient's head was atraumatic, normocephalic.  Eyes:  PERRLA without

jaundice.  The mouth and throat were normal.

NECK:  Supple without JVD, carotid bruits, nor thyroidmegaly.

LUNGS:  Diminished throughout, poor movement of air, but clear.

CARDIOVASCULAR:  At this time, regular sinus rhythm, S1, S2, without murmur, 

rub, thrill, or extra heart sounds.

ABDOMEN:  Soft, nontender, no rebounding, no guarding.  Positive bowel sounds, 

no hepatosplenomegaly was noted.

EXTREMITIES:  No clubbing, cyanosis, nor edema except for that left knee, which 

is markedly swollen, increased warmth secondary to previous surgery as noted for

her surgery as indicated above.  The patient in turn pulses were noted distally.

NEUROLOGIC:  She was stable otherwise.



LABORATORY DATA:  Demonstrated white count of 7, hemoglobin and hematocrit of 10

and 30.  Chemistries; Sodium 140, potassium 3.7, BUN and creatinine 14 and 0.9. 

BNP of 640.  Albumin low at 2.9.  Negative SARS.  Urine too numerous to count 

white blood cells and the like.



IMPRESSION:  Pyelonephritis, recent left knee replacement, recent history of 

atrial fibrillation with rapid ventricular response, hypertensive urgency.



PLAN:  The patient placed on IV antibiotic therapy on situation of anticoagulant

therapy and make further evaluation on her as indicated for those results.







KADEEM/RAT/PABLITO

DR: KADEEM/ana   DD: 11/21/2021 17:50

DT: 11/21/2021 18:32   TID: [id number]

## 2021-11-22 NOTE — PN
DATE: 11/22/2021

SUBJECTIVE:  An 81-year-old female in with pneumonia, urinary tract infection, 

pyelonephritis, left knee post-surgery within the last 10 days, maybe slightly 

infected there as well for that matter, generalized weakness.  The patient seems

to be doing a little better this morning.  She continues to receive IV 

antibiotic therapy for her pneumonia and urinary tract infection and her atrial 

fibrillation, seems to be under better control; although, her blood pressure is 

elevated to a significant level. We are adjusting her medications to adjust for 

that as well.  The patient otherwise is alert and oriented.



OBJECTIVE:

VITAL SIGNS:  Blood pressure 175/80, respiratory rate 20, pulse 85, afebrile, 

room air of 94%.

GENERAL:  The patient is alert and oriented.

HEENT:  Head:  Atraumatic, normocephalic.

LUNGS:  Diminished throughout.  Crackles in the bases.  Lungs are baseline as 

indicated.

CARDIOVASCULAR:  Regular sinus rhythm presently this morning.

ABDOMEN:  Soft, nontender.

EXTREMITIES:  No clubbing, cyanosis or edema.  Left knee still swollen and 

tender.  We will get PT, OT to work on that.

NEUROLOGIC:  Alert and oriented.



IMPRESSION:  Pneumonia, urinary tract infection, pyelonephritis, possible 

superficial cellulitis to the left knee post surgical knee replacement.



PLAN:  Continue with rehabilitation and IV antibiotic therapy and continue 

possible skilled care for the left knee replacement.







KADEEM/TAB

DR: KADEEM/ana   DD: 11/22/2021 09:27

DT: 11/22/2021 11:25   TID: [id number]

## 2021-11-23 VITALS — SYSTOLIC BLOOD PRESSURE: 160 MMHG | DIASTOLIC BLOOD PRESSURE: 74 MMHG

## 2021-11-23 VITALS
SYSTOLIC BLOOD PRESSURE: 160 MMHG | DIASTOLIC BLOOD PRESSURE: 74 MMHG | DIASTOLIC BLOOD PRESSURE: 74 MMHG | SYSTOLIC BLOOD PRESSURE: 160 MMHG

## 2021-11-23 LAB
BASOPHILS # BLD AUTO: 0 X10^3/UL (ref 0–0.2)
BASOPHILS NFR BLD: 1 % (ref 0–3)
EOSINOPHIL NFR BLD: 0.2 X10^3/UL (ref 0–0.7)
EOSINOPHIL NFR BLD: 3 % (ref 0–3)
ERYTHROCYTE [DISTWIDTH] IN BLOOD BY AUTOMATED COUNT: 15.3 % (ref 11.5–14.5)
HCT VFR BLD CALC: 26.2 % (ref 36–47)
HGB BLD-MCNC: 8.6 G/DL (ref 12–15.5)
LYMPHOCYTES # BLD: 2.3 X10^3/UL (ref 1–4.8)
LYMPHOCYTES NFR BLD AUTO: 29 % (ref 24–48)
MCH RBC QN AUTO: 30 PG (ref 25–35)
MCHC RBC AUTO-ENTMCNC: 33 G/DL (ref 31–37)
MCV RBC AUTO: 91 FL (ref 79–100)
MONO #: 1.1 X10^3/UL (ref 0–1.1)
MONOCYTES NFR BLD: 13 % (ref 0–9)
NEUT #: 4.5 X10^3UL (ref 1.8–7.7)
NEUTROPHILS NFR BLD AUTO: 55 % (ref 31–73)
PLATELET # BLD AUTO: 218 X10^3/UL (ref 140–400)
RBC # BLD AUTO: 2.86 X10^6/UL (ref 3.5–5.4)
WBC # BLD AUTO: 8.2 X10^3/UL (ref 4–11)

## 2021-11-23 RX ADMIN — CYANOCOBALAMIN TAB 250 MCG SCH MCG: 250 TAB at 08:23

## 2021-11-23 RX ADMIN — ACETAMINOPHEN PRN MG: 325 TABLET, FILM COATED ORAL at 02:04

## 2021-11-23 RX ADMIN — Medication SCH CAP: at 08:21

## 2021-11-23 RX ADMIN — VITAMIN D, TAB 1000IU (100/BT) SCH UNIT: 25 TAB at 08:21

## 2021-11-23 RX ADMIN — Medication SCH UNIT: at 08:23

## 2021-11-23 RX ADMIN — APIXABAN SCH MG: 5 TABLET, FILM COATED ORAL at 08:22

## 2021-11-23 RX ADMIN — ALLOPURINOL SCH MG: 300 TABLET ORAL at 08:21

## 2021-11-23 RX ADMIN — Medication SCH MG: at 08:20

## 2021-11-23 NOTE — DS
DATE OF DISCHARGE: 11/23/2021

HOSPITAL COURSE:  This is an 81-year-old female who came in through the 

Emergency Room with severe urinary tract infection, pyelonephritis and 

pneumonia.  The patient also had some mild cellulitis to a recent left knee 

replacement 10 days prior to admission.  The patient does have some problems 

with coughing.  Lungs were diminished, but with IV antibiotic therapy being used

to treat her pneumonia as well as her urinary tract infection, the patient did 

have a positive blood culture. Vital signs remained basically stable, however; 

if anything, her blood pressure went up into the 180s and hypertensive urgency. 

Apparently, her cardiologist had changed some of her blood pressure medication 

and that effected that.  In any case, the patient made good progress overall 

with the IV Levaquin, which was sensitive to the E. coli that was isolated out 

of her urine and made good progress overall with that.  The patient remained 

afebrile throughout, pulse remained in her baseline 70s-80s, oxygenation 95-96 

room air.  The patient's lungs remain basically clear.  Abdomen soft.  The 

patient showed no other signs of abnormality.  The patient will continue oral 

antibiotics as an outpatient for at least another week and make further 

assessment on her as indicated.



IMPRESSION:  Pyelonephritis, bacteremia, gram-negative Escherichia coli, 

pneumonia, anemia, probably postsurgical, 8.6 and 26; moderate-to-severe protein

malnutrition, urinary tract infection, SARS negative, cellulitis to the leg, 

pneumonia, hypertensive urgency, history of atrial fibrillation with rapid 

ventricular response, although stayed in sinus rhythm while she was here at this

time.  She will stay on a heart healthy diet, decreased activity.  Follow up 

with her cardiologist, . ____.







KADEEM/KIANNA/FRANCK

DR: KADEEM/ana   DD: 11/23/2021 09:43

DT: 11/23/2021 10:12   TID: [id number]

## 2021-11-23 NOTE — DISCH
HOME HEALTH DISCHARGE/MEDS


DISCHARGE INFORMATION:


Discharge Date:  Nov 23, 2021


Final Diagnosis:


Problems


Medical Problems:


(1) Pneumonia


Status: Acute  





(2) UTI (urinary tract infection)


Status: Acute  





(3) Weakness


Status: Acute  








Condition on Discharge:  Stable





CODE STATUS:


Code Status:  Full





HOME HEALTH:


Face to Face:


I certify this patient is under my care and that I, or a nurse practitioner or 

physician's assistant working with me, had a face to face encounter that meets 

the physician face to face encounter requirements with this patient on November 23, 2021.


Medical Condition(s):  HTN, Pneumonia, S/P Joint Replacement


Skilled Nursing For:  Assess Cardiopulm Status, Assess/Skilled Observatio, 

Medication Management, Pain Management


Physical Therapy For:  Evalulation/Treatment


Homebound Status Met By:  Unsteady balance w/ amb,





POST DISCHARGE ORDERS:


Activity Instructions for Disc:  Activity as tolerated


Weight Bearing Status after Di:  No restrictions


DIET AFTER DISCHARGE:  Regular





CERTIFICATION STATEMENT:


Certification Statement: Based on the above finding, I certify that this patient

is confined to the home and needs intermittent skilled nursing care, physical 

therapy and/or speech therapy, or continues to need occupational therapy.~ This 

patient is under my care, and I have initiated the establishment of the plan of 

care.~ This patient will be followed by myself or a community physician who will

periodically review the plan of care.





DISCHARGE MEDICATIONS:


Home Meds


Reported Medications


Omeprazole (OMEPRAZOLE) 20 Mg Capsule.dr, PO DAILY for GERD, #30 CAP 5 Refills


   11/21/21


Apixaban (ELIQUIS) 5 Mg Tablet, 5 MG PO BID for DVT, TAB


   11/21/21


Duloxetine Hcl (CYMBALTA) 20 Mg Capsule.dr, 1 CAP PO DAILY for Depression, #30 

CAP 2 Refills


   11/21/21


Oxycodone HCl (Roxicodone) 5 Mg Tablet, 1 TAB PO PRN Q4-6HRS PRN for pain MDD 12

Tablet(s) for 5 Days, #60 TAB 0 Refills


   11/21/21


Nitrofurantoin Monohyd/M-Cryst (MACROBID 100 MG CAPSULE) 100 Mg Capsule, 1 CAP 

PO DAILY for UTI for 7 Days, #7 CAP 0 Refills


   11/21/21


Diltiazem Hcl (CARDIZEM TABLET) 60 Mg Tablet, 120 MG PO DAILY for HTN/Rate 

control, #30 TAB 0 Refills


   11/21/21


Omega-3 Fatty Acids/Fish Oil (FISH OIL 1,000 MG SOFTGEL) 1 Each Capsule, 1 EACH 

PO DAILY for SUPPLEMENT, CAP


   8/5/21


Vitamin E Mixed (VITAMIN E) 200 Unit Tablet, 200 UNIT PO DAILY for SUPPLEMENT, 

TAB


   8/5/21


Cyanocobalamin (Vitamin B-12) (B-12) 500 Mcg Tablet, 1 TAB PO DAILY for 

SUPPLEMENT for 30 Days, #30 TAB 0 Refills


   8/5/21


Ibuprofen (IBUPROFEN) 400 Mg Tablet, 400 MG PO PRN Q6HRS PRN for PAIN, TAB


   8/5/21


Acetaminophen (Tylenol) 325 Mg Capsule, 325 MG PO PRN Q6HRS PRN for PAIN, CAP


   8/5/21


Allopurinol (ALLOPURINOL) 300 Mg Tablet, 300 MG PO DAILY for GOUT, TAB


   8/5/21


Cholecalciferol (Vitamin D3) (VITAMIN D) 400 Unit Tablet, 400 UNIT PO DAILY for 

SUPPLEMENT, TAB


   8/5/21











KARI FERNANDEZ MD           Nov 23, 2021 09:27

## 2022-01-28 ENCOUNTER — HOSPITAL ENCOUNTER (OUTPATIENT)
Dept: HOSPITAL 63 - US | Age: 82
End: 2022-01-28
Attending: UROLOGY
Payer: MEDICARE

## 2022-01-28 DIAGNOSIS — N30.20: ICD-10-CM

## 2022-01-28 DIAGNOSIS — N28.1: Primary | ICD-10-CM

## 2022-01-28 PROCEDURE — 76770 US EXAM ABDO BACK WALL COMP: CPT
